# Patient Record
Sex: MALE | Race: OTHER | HISPANIC OR LATINO
[De-identification: names, ages, dates, MRNs, and addresses within clinical notes are randomized per-mention and may not be internally consistent; named-entity substitution may affect disease eponyms.]

---

## 2017-01-24 ENCOUNTER — RECORD ABSTRACTING (OUTPATIENT)
Age: 32
End: 2017-01-24

## 2017-01-24 DIAGNOSIS — E78.5 HYPERLIPIDEMIA, UNSPECIFIED: ICD-10-CM

## 2017-01-24 DIAGNOSIS — Z86.39 PERSONAL HISTORY OF OTHER ENDOCRINE, NUTRITIONAL AND METABOLIC DISEASE: ICD-10-CM

## 2017-03-29 ENCOUNTER — APPOINTMENT (OUTPATIENT)
Dept: INTERNAL MEDICINE | Facility: CLINIC | Age: 32
End: 2017-03-29

## 2017-03-29 ENCOUNTER — RESULT REVIEW (OUTPATIENT)
Age: 32
End: 2017-03-29

## 2017-03-29 VITALS
HEIGHT: 75 IN | BODY MASS INDEX: 33.82 KG/M2 | DIASTOLIC BLOOD PRESSURE: 86 MMHG | WEIGHT: 272 LBS | SYSTOLIC BLOOD PRESSURE: 121 MMHG | HEART RATE: 120 BPM | TEMPERATURE: 97.6 F

## 2017-03-30 LAB — TSH SERPL DL<=0.005 MIU/L-ACNC: 1.38 UIU/ML

## 2017-04-13 ENCOUNTER — APPOINTMENT (OUTPATIENT)
Dept: NUTRITION | Facility: CLINIC | Age: 32
End: 2017-04-13

## 2017-06-14 ENCOUNTER — OUTPATIENT (OUTPATIENT)
Dept: OUTPATIENT SERVICES | Facility: HOSPITAL | Age: 32
LOS: 1 days | Discharge: HOME | End: 2017-06-14

## 2017-06-14 DIAGNOSIS — F20.9 SCHIZOPHRENIA, UNSPECIFIED: ICD-10-CM

## 2017-06-21 ENCOUNTER — OUTPATIENT (OUTPATIENT)
Dept: OUTPATIENT SERVICES | Facility: HOSPITAL | Age: 32
LOS: 1 days | Discharge: HOME | End: 2017-06-21

## 2017-06-21 DIAGNOSIS — F20.9 SCHIZOPHRENIA, UNSPECIFIED: ICD-10-CM

## 2017-06-28 ENCOUNTER — OUTPATIENT (OUTPATIENT)
Dept: OUTPATIENT SERVICES | Facility: HOSPITAL | Age: 32
LOS: 1 days | Discharge: HOME | End: 2017-06-28

## 2017-06-28 DIAGNOSIS — F20.9 SCHIZOPHRENIA, UNSPECIFIED: ICD-10-CM

## 2017-06-28 DIAGNOSIS — Z79.899 OTHER LONG TERM (CURRENT) DRUG THERAPY: ICD-10-CM

## 2017-06-29 ENCOUNTER — APPOINTMENT (OUTPATIENT)
Dept: INTERNAL MEDICINE | Facility: CLINIC | Age: 32
End: 2017-06-29

## 2017-07-05 ENCOUNTER — OUTPATIENT (OUTPATIENT)
Dept: OUTPATIENT SERVICES | Facility: HOSPITAL | Age: 32
LOS: 1 days | Discharge: HOME | End: 2017-07-05

## 2017-07-05 DIAGNOSIS — F20.9 SCHIZOPHRENIA, UNSPECIFIED: ICD-10-CM

## 2017-07-05 DIAGNOSIS — Z79.899 OTHER LONG TERM (CURRENT) DRUG THERAPY: ICD-10-CM

## 2017-07-12 ENCOUNTER — OUTPATIENT (OUTPATIENT)
Dept: OUTPATIENT SERVICES | Facility: HOSPITAL | Age: 32
LOS: 1 days | Discharge: HOME | End: 2017-07-12

## 2017-07-12 DIAGNOSIS — Z79.899 OTHER LONG TERM (CURRENT) DRUG THERAPY: ICD-10-CM

## 2017-07-12 DIAGNOSIS — F20.9 SCHIZOPHRENIA, UNSPECIFIED: ICD-10-CM

## 2017-07-19 ENCOUNTER — OUTPATIENT (OUTPATIENT)
Dept: OUTPATIENT SERVICES | Facility: HOSPITAL | Age: 32
LOS: 1 days | Discharge: HOME | End: 2017-07-19

## 2017-07-19 DIAGNOSIS — F20.9 SCHIZOPHRENIA, UNSPECIFIED: ICD-10-CM

## 2017-07-19 DIAGNOSIS — Z79.899 OTHER LONG TERM (CURRENT) DRUG THERAPY: ICD-10-CM

## 2017-07-26 ENCOUNTER — OUTPATIENT (OUTPATIENT)
Dept: OUTPATIENT SERVICES | Facility: HOSPITAL | Age: 32
LOS: 1 days | Discharge: HOME | End: 2017-07-26

## 2017-07-26 DIAGNOSIS — F20.9 SCHIZOPHRENIA, UNSPECIFIED: ICD-10-CM

## 2017-07-26 DIAGNOSIS — Z79.899 OTHER LONG TERM (CURRENT) DRUG THERAPY: ICD-10-CM

## 2017-07-27 ENCOUNTER — OUTPATIENT (OUTPATIENT)
Dept: OUTPATIENT SERVICES | Facility: HOSPITAL | Age: 32
LOS: 1 days | Discharge: HOME | End: 2017-07-27

## 2017-07-27 ENCOUNTER — APPOINTMENT (OUTPATIENT)
Dept: UROLOGY | Facility: CLINIC | Age: 32
End: 2017-07-27
Payer: MEDICAID

## 2017-07-27 VITALS — HEART RATE: 122 BPM | SYSTOLIC BLOOD PRESSURE: 116 MMHG | DIASTOLIC BLOOD PRESSURE: 80 MMHG

## 2017-07-27 DIAGNOSIS — F20.9 SCHIZOPHRENIA, UNSPECIFIED: ICD-10-CM

## 2017-07-27 PROCEDURE — 99203 OFFICE O/P NEW LOW 30 MIN: CPT

## 2017-08-02 ENCOUNTER — OUTPATIENT (OUTPATIENT)
Dept: OUTPATIENT SERVICES | Facility: HOSPITAL | Age: 32
LOS: 1 days | Discharge: HOME | End: 2017-08-02

## 2017-08-02 DIAGNOSIS — F20.9 SCHIZOPHRENIA, UNSPECIFIED: ICD-10-CM

## 2017-08-02 DIAGNOSIS — Z79.899 OTHER LONG TERM (CURRENT) DRUG THERAPY: ICD-10-CM

## 2017-08-02 DIAGNOSIS — Z02.9 ENCOUNTER FOR ADMINISTRATIVE EXAMINATIONS, UNSPECIFIED: ICD-10-CM

## 2017-08-03 ENCOUNTER — OUTPATIENT (OUTPATIENT)
Dept: OUTPATIENT SERVICES | Facility: HOSPITAL | Age: 32
LOS: 1 days | Discharge: HOME | End: 2017-08-03

## 2017-08-03 DIAGNOSIS — F20.9 SCHIZOPHRENIA, UNSPECIFIED: ICD-10-CM

## 2017-08-03 DIAGNOSIS — Z79.899 OTHER LONG TERM (CURRENT) DRUG THERAPY: ICD-10-CM

## 2017-08-09 ENCOUNTER — OUTPATIENT (OUTPATIENT)
Dept: OUTPATIENT SERVICES | Facility: HOSPITAL | Age: 32
LOS: 1 days | Discharge: HOME | End: 2017-08-09

## 2017-08-09 DIAGNOSIS — F20.9 SCHIZOPHRENIA, UNSPECIFIED: ICD-10-CM

## 2017-08-09 DIAGNOSIS — Z79.899 OTHER LONG TERM (CURRENT) DRUG THERAPY: ICD-10-CM

## 2017-08-16 ENCOUNTER — OUTPATIENT (OUTPATIENT)
Dept: OUTPATIENT SERVICES | Facility: HOSPITAL | Age: 32
LOS: 1 days | Discharge: HOME | End: 2017-08-16

## 2017-08-16 DIAGNOSIS — Z79.899 OTHER LONG TERM (CURRENT) DRUG THERAPY: ICD-10-CM

## 2017-08-16 DIAGNOSIS — F20.9 SCHIZOPHRENIA, UNSPECIFIED: ICD-10-CM

## 2017-08-23 ENCOUNTER — OUTPATIENT (OUTPATIENT)
Dept: OUTPATIENT SERVICES | Facility: HOSPITAL | Age: 32
LOS: 1 days | Discharge: HOME | End: 2017-08-23

## 2017-08-23 DIAGNOSIS — F20.9 SCHIZOPHRENIA, UNSPECIFIED: ICD-10-CM

## 2017-08-23 DIAGNOSIS — Z79.899 OTHER LONG TERM (CURRENT) DRUG THERAPY: ICD-10-CM

## 2017-08-30 ENCOUNTER — OUTPATIENT (OUTPATIENT)
Dept: OUTPATIENT SERVICES | Facility: HOSPITAL | Age: 32
LOS: 1 days | Discharge: HOME | End: 2017-08-30

## 2017-08-30 DIAGNOSIS — F20.9 SCHIZOPHRENIA, UNSPECIFIED: ICD-10-CM

## 2017-08-30 DIAGNOSIS — R32 UNSPECIFIED URINARY INCONTINENCE: ICD-10-CM

## 2017-08-30 DIAGNOSIS — Z79.899 OTHER LONG TERM (CURRENT) DRUG THERAPY: ICD-10-CM

## 2017-09-07 ENCOUNTER — APPOINTMENT (OUTPATIENT)
Dept: UROLOGY | Facility: CLINIC | Age: 32
End: 2017-09-07

## 2017-09-27 ENCOUNTER — OUTPATIENT (OUTPATIENT)
Dept: OUTPATIENT SERVICES | Facility: HOSPITAL | Age: 32
LOS: 1 days | Discharge: HOME | End: 2017-09-27

## 2017-09-27 DIAGNOSIS — F20.9 SCHIZOPHRENIA, UNSPECIFIED: ICD-10-CM

## 2017-09-27 DIAGNOSIS — Z79.899 OTHER LONG TERM (CURRENT) DRUG THERAPY: ICD-10-CM

## 2017-10-03 LAB
APPEARANCE UR: CLEAR
BILIRUB UR QL STRIP: NEGATIVE
COLOR UR: YELLOW
GLUCOSE UR STRIP-MCNC: NEGATIVE MG/DL
HGB UR QL STRIP: NEGATIVE
KETONES UR STRIP-MCNC: NEGATIVE MG/DL
NITRITE UR QL STRIP: NEGATIVE
PH UR STRIP: 6
PROT UR STRIP-MCNC: 30 MG/DL
SP GR UR STRIP: 1.02
UROBILINOGEN UR STRIP-MCNC: 0.2 MG/DL
WBC URNS QL MICRO: NEGATIVE

## 2017-10-25 ENCOUNTER — OUTPATIENT (OUTPATIENT)
Dept: OUTPATIENT SERVICES | Facility: HOSPITAL | Age: 32
LOS: 1 days | Discharge: HOME | End: 2017-10-25

## 2017-10-25 DIAGNOSIS — F20.9 SCHIZOPHRENIA, UNSPECIFIED: ICD-10-CM

## 2017-10-25 DIAGNOSIS — Z79.899 OTHER LONG TERM (CURRENT) DRUG THERAPY: ICD-10-CM

## 2017-11-21 ENCOUNTER — OUTPATIENT (OUTPATIENT)
Dept: OUTPATIENT SERVICES | Facility: HOSPITAL | Age: 32
LOS: 1 days | Discharge: HOME | End: 2017-11-21

## 2017-11-21 DIAGNOSIS — F20.9 SCHIZOPHRENIA, UNSPECIFIED: ICD-10-CM

## 2017-11-21 DIAGNOSIS — Z79.899 OTHER LONG TERM (CURRENT) DRUG THERAPY: ICD-10-CM

## 2017-12-20 ENCOUNTER — OUTPATIENT (OUTPATIENT)
Dept: OUTPATIENT SERVICES | Facility: HOSPITAL | Age: 32
LOS: 1 days | Discharge: HOME | End: 2017-12-20

## 2017-12-20 DIAGNOSIS — F20.9 SCHIZOPHRENIA, UNSPECIFIED: ICD-10-CM

## 2017-12-20 DIAGNOSIS — Z79.899 OTHER LONG TERM (CURRENT) DRUG THERAPY: ICD-10-CM

## 2017-12-21 ENCOUNTER — OUTPATIENT (OUTPATIENT)
Dept: OUTPATIENT SERVICES | Facility: HOSPITAL | Age: 32
LOS: 1 days | Discharge: HOME | End: 2017-12-21

## 2017-12-21 ENCOUNTER — APPOINTMENT (OUTPATIENT)
Dept: UROLOGY | Facility: CLINIC | Age: 32
End: 2017-12-21
Payer: MEDICAID

## 2017-12-21 ENCOUNTER — APPOINTMENT (OUTPATIENT)
Dept: INTERNAL MEDICINE | Facility: CLINIC | Age: 32
End: 2017-12-21
Payer: MEDICAID

## 2017-12-21 VITALS
DIASTOLIC BLOOD PRESSURE: 78 MMHG | SYSTOLIC BLOOD PRESSURE: 106 MMHG | HEIGHT: 73 IN | WEIGHT: 270 LBS | BODY MASS INDEX: 35.78 KG/M2 | HEART RATE: 98 BPM

## 2017-12-21 VITALS
SYSTOLIC BLOOD PRESSURE: 122 MMHG | BODY MASS INDEX: 33.53 KG/M2 | WEIGHT: 253 LBS | DIASTOLIC BLOOD PRESSURE: 84 MMHG | HEIGHT: 73 IN | HEART RATE: 109 BPM

## 2017-12-21 DIAGNOSIS — F20.9 SCHIZOPHRENIA, UNSPECIFIED: ICD-10-CM

## 2017-12-21 DIAGNOSIS — R35.0 FREQUENCY OF MICTURITION: ICD-10-CM

## 2017-12-21 PROCEDURE — 99213 OFFICE O/P EST LOW 20 MIN: CPT

## 2017-12-22 DIAGNOSIS — I10 ESSENTIAL (PRIMARY) HYPERTENSION: ICD-10-CM

## 2017-12-22 DIAGNOSIS — E66.9 OBESITY, UNSPECIFIED: ICD-10-CM

## 2017-12-22 DIAGNOSIS — E78.5 HYPERLIPIDEMIA, UNSPECIFIED: ICD-10-CM

## 2017-12-22 DIAGNOSIS — F20.89 OTHER SCHIZOPHRENIA: ICD-10-CM

## 2018-01-17 ENCOUNTER — OUTPATIENT (OUTPATIENT)
Dept: OUTPATIENT SERVICES | Facility: HOSPITAL | Age: 33
LOS: 1 days | Discharge: HOME | End: 2018-01-17

## 2018-01-17 DIAGNOSIS — F20.9 SCHIZOPHRENIA, UNSPECIFIED: ICD-10-CM

## 2018-01-17 DIAGNOSIS — Z79.899 OTHER LONG TERM (CURRENT) DRUG THERAPY: ICD-10-CM

## 2018-02-14 ENCOUNTER — OUTPATIENT (OUTPATIENT)
Dept: OUTPATIENT SERVICES | Facility: HOSPITAL | Age: 33
LOS: 1 days | Discharge: HOME | End: 2018-02-14

## 2018-02-14 DIAGNOSIS — F20.9 SCHIZOPHRENIA, UNSPECIFIED: ICD-10-CM

## 2018-02-14 DIAGNOSIS — Z79.899 OTHER LONG TERM (CURRENT) DRUG THERAPY: ICD-10-CM

## 2018-02-28 ENCOUNTER — APPOINTMENT (OUTPATIENT)
Dept: INTERNAL MEDICINE | Facility: CLINIC | Age: 33
End: 2018-02-28

## 2018-03-14 ENCOUNTER — OUTPATIENT (OUTPATIENT)
Dept: OUTPATIENT SERVICES | Facility: HOSPITAL | Age: 33
LOS: 1 days | Discharge: HOME | End: 2018-03-14

## 2018-03-14 DIAGNOSIS — F20.9 SCHIZOPHRENIA, UNSPECIFIED: ICD-10-CM

## 2018-03-14 DIAGNOSIS — Z79.899 OTHER LONG TERM (CURRENT) DRUG THERAPY: ICD-10-CM

## 2018-03-27 ENCOUNTER — LABORATORY RESULT (OUTPATIENT)
Age: 33
End: 2018-03-27

## 2018-03-27 ENCOUNTER — APPOINTMENT (OUTPATIENT)
Dept: INTERNAL MEDICINE | Facility: CLINIC | Age: 33
End: 2018-03-27

## 2018-03-27 ENCOUNTER — OUTPATIENT (OUTPATIENT)
Dept: OUTPATIENT SERVICES | Facility: HOSPITAL | Age: 33
LOS: 1 days | Discharge: HOME | End: 2018-03-27

## 2018-03-27 VITALS
DIASTOLIC BLOOD PRESSURE: 87 MMHG | SYSTOLIC BLOOD PRESSURE: 124 MMHG | HEIGHT: 73 IN | WEIGHT: 258 LBS | HEART RATE: 112 BPM | BODY MASS INDEX: 34.19 KG/M2

## 2018-03-27 DIAGNOSIS — R41.89 OTHER SYMPTOMS AND SIGNS INVOLVING COGNITIVE FUNCTIONS AND AWARENESS: ICD-10-CM

## 2018-03-28 DIAGNOSIS — E78.5 HYPERLIPIDEMIA, UNSPECIFIED: ICD-10-CM

## 2018-03-28 DIAGNOSIS — J44.9 CHRONIC OBSTRUCTIVE PULMONARY DISEASE, UNSPECIFIED: ICD-10-CM

## 2018-03-28 DIAGNOSIS — E11.65 TYPE 2 DIABETES MELLITUS WITH HYPERGLYCEMIA: ICD-10-CM

## 2018-03-28 DIAGNOSIS — N40.0 BENIGN PROSTATIC HYPERPLASIA WITHOUT LOWER URINARY TRACT SYMPTOMS: ICD-10-CM

## 2018-03-28 DIAGNOSIS — I10 ESSENTIAL (PRIMARY) HYPERTENSION: ICD-10-CM

## 2018-03-28 LAB
ALBUMIN SERPL ELPH-MCNC: 4.6 G/DL
ALP BLD-CCNC: 110 U/L
ALT SERPL-CCNC: 20 U/L
ANION GAP SERPL CALC-SCNC: 16 MMOL/L
APPEARANCE: CLEAR
AST SERPL-CCNC: 17 U/L
BILIRUB SERPL-MCNC: 0.3 MG/DL
BILIRUBIN URINE: NEGATIVE
BLOOD URINE: NEGATIVE
BUN SERPL-MCNC: 13 MG/DL
CALCIUM SERPL-MCNC: 9.2 MG/DL
CHLORIDE SERPL-SCNC: 104 MMOL/L
CHOLEST SERPL-MCNC: 208 MG/DL
CHOLEST/HDLC SERPL: 5.8 RATIO
CO2 SERPL-SCNC: 22 MMOL/L
COLOR: YELLOW
CREAT SERPL-MCNC: 0.7 MG/DL
GLUCOSE QUALITATIVE U: NEGATIVE MG/DL
GLUCOSE SERPL-MCNC: 85 MG/DL
HDLC SERPL-MCNC: 36 MG/DL
KETONES URINE: NEGATIVE
LDLC SERPL CALC-MCNC: 135 MG/DL
LEUKOCYTE ESTERASE URINE: NEGATIVE
NITRITE URINE: NEGATIVE
PH URINE: 6
POTASSIUM SERPL-SCNC: 4.1 MMOL/L
PROT SERPL-MCNC: 7.4 G/DL
PROTEIN URINE: ABNORMAL MG/DL
SODIUM SERPL-SCNC: 142 MMOL/L
SPECIFIC GRAVITY URINE: 1.01
TRIGL SERPL-MCNC: 321 MG/DL
TSH SERPL-ACNC: 2.24 UIU/ML
UROBILINOGEN URINE: NEGATIVE MG/DL

## 2018-03-29 ENCOUNTER — APPOINTMENT (OUTPATIENT)
Dept: UROLOGY | Facility: CLINIC | Age: 33
End: 2018-03-29

## 2018-04-10 LAB — 25(OH)D3 SERPL-MCNC: 17 NG/ML

## 2018-04-11 ENCOUNTER — OUTPATIENT (OUTPATIENT)
Dept: OUTPATIENT SERVICES | Facility: HOSPITAL | Age: 33
LOS: 1 days | Discharge: HOME | End: 2018-04-11

## 2018-04-11 DIAGNOSIS — F20.9 SCHIZOPHRENIA, UNSPECIFIED: ICD-10-CM

## 2018-04-11 DIAGNOSIS — Z79.899 OTHER LONG TERM (CURRENT) DRUG THERAPY: ICD-10-CM

## 2018-04-23 ENCOUNTER — RX RENEWAL (OUTPATIENT)
Age: 33
End: 2018-04-23

## 2018-04-30 ENCOUNTER — APPOINTMENT (OUTPATIENT)
Dept: NUTRITION | Facility: CLINIC | Age: 33
End: 2018-04-30

## 2018-05-09 ENCOUNTER — OUTPATIENT (OUTPATIENT)
Dept: OUTPATIENT SERVICES | Facility: HOSPITAL | Age: 33
LOS: 1 days | Discharge: HOME | End: 2018-05-09

## 2018-05-09 DIAGNOSIS — Z79.899 OTHER LONG TERM (CURRENT) DRUG THERAPY: ICD-10-CM

## 2018-05-09 DIAGNOSIS — F20.9 SCHIZOPHRENIA, UNSPECIFIED: ICD-10-CM

## 2018-05-25 ENCOUNTER — OUTPATIENT (OUTPATIENT)
Dept: OUTPATIENT SERVICES | Facility: HOSPITAL | Age: 33
LOS: 1 days | Discharge: HOME | End: 2018-05-25

## 2018-05-25 DIAGNOSIS — R10.10 UPPER ABDOMINAL PAIN, UNSPECIFIED: ICD-10-CM

## 2018-06-06 ENCOUNTER — OUTPATIENT (OUTPATIENT)
Dept: OUTPATIENT SERVICES | Facility: HOSPITAL | Age: 33
LOS: 1 days | Discharge: HOME | End: 2018-06-06

## 2018-06-06 DIAGNOSIS — F20.9 SCHIZOPHRENIA, UNSPECIFIED: ICD-10-CM

## 2018-06-06 DIAGNOSIS — Z79.899 OTHER LONG TERM (CURRENT) DRUG THERAPY: ICD-10-CM

## 2018-07-03 ENCOUNTER — OUTPATIENT (OUTPATIENT)
Dept: OUTPATIENT SERVICES | Facility: HOSPITAL | Age: 33
LOS: 1 days | Discharge: HOME | End: 2018-07-03

## 2018-07-03 DIAGNOSIS — Z79.899 OTHER LONG TERM (CURRENT) DRUG THERAPY: ICD-10-CM

## 2018-07-03 DIAGNOSIS — F20.9 SCHIZOPHRENIA, UNSPECIFIED: ICD-10-CM

## 2018-08-01 ENCOUNTER — OUTPATIENT (OUTPATIENT)
Dept: OUTPATIENT SERVICES | Facility: HOSPITAL | Age: 33
LOS: 1 days | Discharge: HOME | End: 2018-08-01

## 2018-08-01 DIAGNOSIS — Z79.899 OTHER LONG TERM (CURRENT) DRUG THERAPY: ICD-10-CM

## 2018-08-01 DIAGNOSIS — F20.9 SCHIZOPHRENIA, UNSPECIFIED: ICD-10-CM

## 2018-08-29 ENCOUNTER — OUTPATIENT (OUTPATIENT)
Dept: OUTPATIENT SERVICES | Facility: HOSPITAL | Age: 33
LOS: 1 days | Discharge: HOME | End: 2018-08-29

## 2018-08-29 DIAGNOSIS — Z79.899 OTHER LONG TERM (CURRENT) DRUG THERAPY: ICD-10-CM

## 2018-08-29 DIAGNOSIS — F20.9 SCHIZOPHRENIA, UNSPECIFIED: ICD-10-CM

## 2018-09-25 ENCOUNTER — APPOINTMENT (OUTPATIENT)
Dept: INTERNAL MEDICINE | Facility: CLINIC | Age: 33
End: 2018-09-25

## 2018-09-26 ENCOUNTER — OUTPATIENT (OUTPATIENT)
Dept: OUTPATIENT SERVICES | Facility: HOSPITAL | Age: 33
LOS: 1 days | Discharge: HOME | End: 2018-09-26

## 2018-09-26 DIAGNOSIS — Z79.899 OTHER LONG TERM (CURRENT) DRUG THERAPY: ICD-10-CM

## 2018-09-26 DIAGNOSIS — F20.9 SCHIZOPHRENIA, UNSPECIFIED: ICD-10-CM

## 2018-10-24 ENCOUNTER — OUTPATIENT (OUTPATIENT)
Dept: OUTPATIENT SERVICES | Facility: HOSPITAL | Age: 33
LOS: 1 days | Discharge: HOME | End: 2018-10-24

## 2018-10-24 DIAGNOSIS — F20.9 SCHIZOPHRENIA, UNSPECIFIED: ICD-10-CM

## 2018-10-24 DIAGNOSIS — Z79.899 OTHER LONG TERM (CURRENT) DRUG THERAPY: ICD-10-CM

## 2018-10-31 ENCOUNTER — APPOINTMENT (OUTPATIENT)
Dept: INTERNAL MEDICINE | Facility: CLINIC | Age: 33
End: 2018-10-31

## 2018-11-15 ENCOUNTER — EMERGENCY (EMERGENCY)
Facility: HOSPITAL | Age: 33
LOS: 0 days | Discharge: HOME | End: 2018-11-15
Attending: EMERGENCY MEDICINE | Admitting: EMERGENCY MEDICINE

## 2018-11-15 VITALS
RESPIRATION RATE: 20 BRPM | SYSTOLIC BLOOD PRESSURE: 128 MMHG | OXYGEN SATURATION: 99 % | DIASTOLIC BLOOD PRESSURE: 79 MMHG | TEMPERATURE: 97 F | HEART RATE: 110 BPM

## 2018-11-15 VITALS — HEART RATE: 92 BPM

## 2018-11-15 DIAGNOSIS — T42.4X5A ADVERSE EFFECT OF BENZODIAZEPINES, INITIAL ENCOUNTER: ICD-10-CM

## 2018-11-15 DIAGNOSIS — R11.0 NAUSEA: ICD-10-CM

## 2018-11-15 NOTE — ED ADULT NURSE NOTE - CHIEF COMPLAINT QUOTE
Patient given the wrong medication from [address] [place] [address]. Patient given Clozapine and Klonopin. Patient only takes Clozapine.

## 2018-11-15 NOTE — ED PROVIDER NOTE - PROGRESS NOTE DETAILS
Spoke with nurse at [facility], pt was given 2mh of klonopin instead of 100 mg clozipine. Pt took normal scheduled clozipine and other medication. Nurse reports pt stated he was nauseas and was sent to ED. Pt in ED now with no complaints. Spoke with nurse at [facility], pt was given 2mh of Klonopin instead of 100 mg clozapine. Pt took normal scheduled clozapine and other medication. Nurse reports pt stated he was nauseas and was sent to ED. Pt in ED now with no complaints. Pt is now more awake in ED. Drinking juice and eating. I was directly involved in the management of this patient. Case was discussed with PA Fellow Eusebio

## 2018-11-15 NOTE — ED PROVIDER NOTE - ATTENDING CONTRIBUTION TO CARE
33-year-old male sent from [facility] after an accidental ingestion of 2 mg clonazepam at approximately 9:15 AM today, the patient transiently felt nauseated, however currently feels tired but denies other complaints, denies modifying factors or other associated complaint is present.      VSS, awake, alert, non-toxic appearing, lying comfortably in stretcher, in NAD, ears clear, oropharynx clear, mmm, no JVD or bruit, no skin rash or lesions, chest CTAB, non-labored breathing, no w/r/r, +S1/S2, RRR, no m/r/g, abdomen soft, NT, ND, +BS, no peripheral edema or deformities, alert, clear speech and steady gait. The case was discussed with toxicology who recommend outpatient observation, which can be performed at [facility].

## 2018-11-15 NOTE — ED PROVIDER NOTE - PHYSICAL EXAMINATION
Physical Exam    Vital Signs: I have reviewed the initial vital signs.  Constitutional: well-nourished, appears stated age, no acute distress  Eyes: Conjunctiva pink, Sclera clear, PERRLA, EOMI.  Cardiovascular: S1 and S2, regular rate, regular rhythm, well-perfused extremities, radial pulses equal and 2+  Respiratory: unlabored respiratory effort, clear to auscultation bilaterally no wheezing, rales and rhonchi  Gastrointestinal: soft, non-tender abdomen, no pulsatile mass, normal bowl sounds  Integumentary: warm, dry, no rash  Neurologic: awake, alert, cranial nerves II-XII grossly intact, extremities’ motor and sensory functions grossly intact  Psychiatric: Pt appears sleepy, but is accusable to verbal stimuli and answers all questions appropriately.

## 2018-11-15 NOTE — ED PROVIDER NOTE - NS ED ROS FT
Constitutional: (-) fever (-) fatigue, (-) weakness  Eyes: (-) visual changes  Cardiovascular: (-) chest pain, (-) syncope  Respiratory: (-) cough, (-) shortness of breath, (-) dyspnea,   Gastrointestinal: (-) vomiting, (-) diarrhea, (-)nausea,   Integumentary: (-) rash  Neurological: (-) headache, , (-) dizziness, (-) tingling, (-)numbness  Peripheral Vascular: (-) pain while walking, (-) leg swelling, (-) color changes  :(-)dysuria,

## 2018-11-15 NOTE — ED ADULT NURSE NOTE - OBJECTIVE STATEMENT
patient states he was given wrong medication at [facility]. states he is supposed to get clozapine and was given Klonopin instead. patient denies chest pain/sob. states he feels tired

## 2018-11-15 NOTE — ED PROVIDER NOTE - OBJECTIVE STATEMENT
32 yo male, PMH of bipolar disorder and schizophrenia, presents to the ED from [facility]. Pt was given 2mg of klonopin mistakenly for 100 mg of clozapine. At this time patient has no complaints. Denies chest pain, N/V/D, SOB, palpiations, HA, numbness, tingling, dizziness, abd pain, fever, visual changes, and rash. 34 yo male, PMH of bipolar disorder and schizophrenia, presents to the ED from [facility]. Pt was given 2mg of klonopin mistakenly for 100 mg of clozapine. At this time patient has no complaints. Denies chest pain, N/V/D, SOB, palpitations, HA, numbness, tingling, dizziness, abd pain, fever, visual changes, and rash.

## 2018-11-15 NOTE — ED ADULT TRIAGE NOTE - CHIEF COMPLAINT QUOTE
Patient given the wrong medication from [address] [place] [address]. Patient given Clozapine and Klonopin. Patient only takes Clozapine.

## 2018-11-20 ENCOUNTER — OUTPATIENT (OUTPATIENT)
Dept: OUTPATIENT SERVICES | Facility: HOSPITAL | Age: 33
LOS: 1 days | Discharge: HOME | End: 2018-11-20

## 2018-11-20 DIAGNOSIS — Z79.899 OTHER LONG TERM (CURRENT) DRUG THERAPY: ICD-10-CM

## 2018-11-20 DIAGNOSIS — F20.9 SCHIZOPHRENIA, UNSPECIFIED: ICD-10-CM

## 2018-12-19 ENCOUNTER — OUTPATIENT (OUTPATIENT)
Dept: OUTPATIENT SERVICES | Facility: HOSPITAL | Age: 33
LOS: 1 days | Discharge: HOME | End: 2018-12-19

## 2018-12-19 ENCOUNTER — APPOINTMENT (OUTPATIENT)
Dept: INTERNAL MEDICINE | Facility: CLINIC | Age: 33
End: 2018-12-19

## 2018-12-19 VITALS
WEIGHT: 262 LBS | DIASTOLIC BLOOD PRESSURE: 76 MMHG | HEART RATE: 121 BPM | BODY MASS INDEX: 34.72 KG/M2 | HEIGHT: 73 IN | TEMPERATURE: 98.1 F | SYSTOLIC BLOOD PRESSURE: 118 MMHG

## 2018-12-19 DIAGNOSIS — F20.9 SCHIZOPHRENIA, UNSPECIFIED: ICD-10-CM

## 2018-12-19 DIAGNOSIS — Z79.899 OTHER LONG TERM (CURRENT) DRUG THERAPY: ICD-10-CM

## 2018-12-19 NOTE — ASSESSMENT
[FreeTextEntry1] : 32 years old male with history of DLD, Obesity, schizoaffective disorder and urinary incontinence resides in a group home presenting for routine follow up. Last seen on 03/27/2018 for routine f/u. Patient currently denies any complaints.\par \par #) Vitamin D Deficiency\par -Vit. D (04/10/2018)- 17\par -started vitamin D pill\par \par #) Urinary incontinence\par -patient is maintained on oxybutynin\par -follows w/ urologist (Dr. Worthy)\par \par #) Schizoaffective disorder\par -on antipsychotics and is following up with psychiatry. Will refer for neuropsychology for cognitive impairment evaluation as per  "Anne-Marie Thomas"\par \par #) DLD\par -Lipid profile (03/28/2018)- Tot. Cho.- 208, TG- 321, HDL- 36, LDL- 135\par -Age<40, ASCVD not applciable\par -Continue diet and exercise\par -dietician referral given\par \par #) Obesity\par -Continue diet and exercise\par \par #) HCM\par -118/76\par -flu shot given\par -cbc, cmp (03/28/2018)- wnl\par -A1c (03/28/2018)- 5.1\par -TSH (03/28/2018) -2.24\par -routine bloodwork ordered- a1c, lipid profile\par -RTC in 6 months\par

## 2018-12-19 NOTE — PHYSICAL EXAM
[Normal] : no CVA tenderness, no spinal tenderness [de-identified] : denies homicidal/ suicidal ideations

## 2018-12-20 DIAGNOSIS — Z23 ENCOUNTER FOR IMMUNIZATION: ICD-10-CM

## 2018-12-20 DIAGNOSIS — E78.5 HYPERLIPIDEMIA, UNSPECIFIED: ICD-10-CM

## 2018-12-20 DIAGNOSIS — E66.9 OBESITY, UNSPECIFIED: ICD-10-CM

## 2018-12-20 DIAGNOSIS — F20.89 OTHER SCHIZOPHRENIA: ICD-10-CM

## 2018-12-31 ENCOUNTER — APPOINTMENT (OUTPATIENT)
Dept: NUTRITION | Facility: CLINIC | Age: 33
End: 2018-12-31

## 2019-01-16 ENCOUNTER — OUTPATIENT (OUTPATIENT)
Dept: OUTPATIENT SERVICES | Facility: HOSPITAL | Age: 34
LOS: 1 days | Discharge: HOME | End: 2019-01-16

## 2019-01-16 DIAGNOSIS — Z79.899 OTHER LONG TERM (CURRENT) DRUG THERAPY: ICD-10-CM

## 2019-01-16 DIAGNOSIS — F20.9 SCHIZOPHRENIA, UNSPECIFIED: ICD-10-CM

## 2019-02-13 ENCOUNTER — OUTPATIENT (OUTPATIENT)
Dept: OUTPATIENT SERVICES | Facility: HOSPITAL | Age: 34
LOS: 1 days | Discharge: HOME | End: 2019-02-13

## 2019-02-13 ENCOUNTER — RX RENEWAL (OUTPATIENT)
Age: 34
End: 2019-02-13

## 2019-02-13 DIAGNOSIS — F20.9 SCHIZOPHRENIA, UNSPECIFIED: ICD-10-CM

## 2019-02-13 DIAGNOSIS — Z79.899 OTHER LONG TERM (CURRENT) DRUG THERAPY: ICD-10-CM

## 2019-02-18 ENCOUNTER — RX RENEWAL (OUTPATIENT)
Age: 34
End: 2019-02-18

## 2019-03-06 ENCOUNTER — OUTPATIENT (OUTPATIENT)
Dept: OUTPATIENT SERVICES | Facility: HOSPITAL | Age: 34
LOS: 1 days | Discharge: HOME | End: 2019-03-06

## 2019-03-06 ENCOUNTER — APPOINTMENT (OUTPATIENT)
Dept: INTERNAL MEDICINE | Facility: CLINIC | Age: 34
End: 2019-03-06

## 2019-03-06 ENCOUNTER — LABORATORY RESULT (OUTPATIENT)
Age: 34
End: 2019-03-06

## 2019-03-06 VITALS
TEMPERATURE: 98.1 F | SYSTOLIC BLOOD PRESSURE: 133 MMHG | DIASTOLIC BLOOD PRESSURE: 86 MMHG | WEIGHT: 259 LBS | BODY MASS INDEX: 34.33 KG/M2 | HEART RATE: 109 BPM | HEIGHT: 73 IN

## 2019-03-06 NOTE — ASSESSMENT
[FreeTextEntry1] : 32 years old male with history of DLD, Obesity, schizoaffective disorder and urinary incontinence resides in a group home presenting for routine follow up. Last seen on 03/27/2018 for routine f/u. Patient currently denies any complaints.\par \par #) Vitamin D Deficiency\par -Vit. D (04/10/2018)- 17\par -will repeat level\par \par #) Urinary incontinence\par -patient is maintained on oxybutynin\par -advised to follow up with urologist (Dr. Worthy), referral given\par \par #) Schizoaffective disorder\par -on antipsychotics\par -f/u physch\par \par #) DLD\par -Lipid profile (03/28/2018)- Tot. Cho.- 208, TG- 321, HDL- 36, LDL- 135\par -Continue diet and exercise\par -will repeat lipid panel\par \par #) chronic constipation\par on docusate\par \par #) Obesity\par -Continue diet and exercise\par \par #) HCM\par -flu shot given in 12/2018\par -routine bloodwork ordered\par -RTC in 3 months

## 2019-03-06 NOTE — PHYSICAL EXAM
[No Acute Distress] : no acute distress [Well Nourished] : well nourished [Well Developed] : well developed [Well-Appearing] : well-appearing [Normal Sclera/Conjunctiva] : normal sclera/conjunctiva [PERRL] : pupils equal round and reactive to light [EOMI] : extraocular movements intact [Normal Outer Ear/Nose] : the outer ears and nose were normal in appearance [Normal Oropharynx] : the oropharynx was normal [No JVD] : no jugular venous distention [Supple] : supple [No Lymphadenopathy] : no lymphadenopathy [Thyroid Normal, No Nodules] : the thyroid was normal and there were no nodules present [No Respiratory Distress] : no respiratory distress  [Clear to Auscultation] : lungs were clear to auscultation bilaterally [No Accessory Muscle Use] : no accessory muscle use [Normal Rate] : normal rate  [Regular Rhythm] : with a regular rhythm [Normal S1, S2] : normal S1 and S2 [No Murmur] : no murmur heard [No Carotid Bruits] : no carotid bruits [No Abdominal Bruit] : a ~M bruit was not heard ~T in the abdomen [No Varicosities] : no varicosities [Pedal Pulses Present] : the pedal pulses are present [No Edema] : there was no peripheral edema [No Extremity Clubbing/Cyanosis] : no extremity clubbing/cyanosis [No Palpable Aorta] : no palpable aorta [Soft] : abdomen soft [Non Tender] : non-tender [Non-distended] : non-distended [No Masses] : no abdominal mass palpated [No HSM] : no HSM [Normal Bowel Sounds] : normal bowel sounds [No CVA Tenderness] : no CVA  tenderness [No Spinal Tenderness] : no spinal tenderness [No Joint Swelling] : no joint swelling [Grossly Normal Strength/Tone] : grossly normal strength/tone [No Rash] : no rash [No Focal Deficits] : no focal deficits [Deep Tendon Reflexes (DTR)] : deep tendon reflexes were 2+ and symmetric

## 2019-03-06 NOTE — HISTORY OF PRESENT ILLNESS
[FreeTextEntry1] : follow up [de-identified] : 33 years old male with history of DLD, Obesity, schizoaffective disorder and urinary incontinence resides in a group home presenting for routine follow up.patient feels well and denies any complaints. Patient currently denies any complaints.\par

## 2019-03-07 LAB
25(OH)D3 SERPL-MCNC: 30 NG/ML
ALBUMIN SERPL ELPH-MCNC: 4.6 G/DL
ALP BLD-CCNC: 113 U/L
ALT SERPL-CCNC: 16 U/L
ANION GAP SERPL CALC-SCNC: 16 MMOL/L
AST SERPL-CCNC: 13 U/L
BASOPHILS # BLD AUTO: 0 K/UL
BASOPHILS NFR BLD AUTO: 0 %
BILIRUB SERPL-MCNC: 0.4 MG/DL
BUN SERPL-MCNC: 12 MG/DL
CALCIUM SERPL-MCNC: 9.5 MG/DL
CHLORIDE SERPL-SCNC: 103 MMOL/L
CHOLEST SERPL-MCNC: 187 MG/DL
CHOLEST/HDLC SERPL: 4.8 RATIO
CO2 SERPL-SCNC: 24 MMOL/L
CREAT SERPL-MCNC: 0.7 MG/DL
EOSINOPHIL # BLD AUTO: 0 K/UL
EOSINOPHIL NFR BLD AUTO: 0 %
GLUCOSE SERPL-MCNC: 114 MG/DL
HBV SURFACE AB SER QL: NONREACTIVE
HBV SURFACE AG SER QL: NONREACTIVE
HCT VFR BLD CALC: 43.5 %
HCV AB SER QL: NONREACTIVE
HCV S/CO RATIO: 0.19 S/CO
HDLC SERPL-MCNC: 39 MG/DL
HGB BLD-MCNC: 14.3 G/DL
HIV1+2 AB SPEC QL IA.RAPID: NONREACTIVE
IMM GRANULOCYTES NFR BLD AUTO: 0.3 %
LDLC SERPL CALC-MCNC: 128 MG/DL
LYMPHOCYTES # BLD AUTO: 1.45 K/UL
LYMPHOCYTES NFR BLD AUTO: 20.3 %
MAN DIFF?: NORMAL
MCHC RBC-ENTMCNC: 28.1 PG
MCHC RBC-ENTMCNC: 32.9 G/DL
MCV RBC AUTO: 85.5 FL
MEV IGG FLD QL IA: 158 AU/ML
MEV IGG+IGM SER-IMP: POSITIVE
MONOCYTES # BLD AUTO: 0.44 K/UL
MONOCYTES NFR BLD AUTO: 6.1 %
MUV AB SER-ACNC: POSITIVE
MUV IGG SER QL IA: 264 AU/ML
NEUTROPHILS # BLD AUTO: 5.25 K/UL
NEUTROPHILS NFR BLD AUTO: 73.3 %
PLATELET # BLD AUTO: 331 K/UL
POTASSIUM SERPL-SCNC: 4 MMOL/L
PROT SERPL-MCNC: 7.8 G/DL
RBC # BLD: 5.09 M/UL
RBC # FLD: 12.7 %
RUBV IGG FLD-ACNC: 2.8 INDEX
RUBV IGG SER-IMP: POSITIVE
SODIUM SERPL-SCNC: 143 MMOL/L
TRIGL SERPL-MCNC: 223 MG/DL
TSH SERPL-ACNC: 1.47 UIU/ML
WBC # FLD AUTO: 7.16 K/UL

## 2019-03-12 LAB
M TB IFN-G BLD-IMP: NEGATIVE
QUANTIFERON TB PLUS MITOGEN MINUS NIL: 8.55 IU/ML
QUANTIFERON TB PLUS NIL: 0.02 IU/ML
QUANTIFERON TB PLUS TB1 MINUS NIL: 0 IU/ML
QUANTIFERON TB PLUS TB2 MINUS NIL: -0.01 IU/ML

## 2019-03-13 ENCOUNTER — OUTPATIENT (OUTPATIENT)
Dept: OUTPATIENT SERVICES | Facility: HOSPITAL | Age: 34
LOS: 1 days | Discharge: HOME | End: 2019-03-13

## 2019-03-13 DIAGNOSIS — F20.9 SCHIZOPHRENIA, UNSPECIFIED: ICD-10-CM

## 2019-03-13 DIAGNOSIS — Z79.899 OTHER LONG TERM (CURRENT) DRUG THERAPY: ICD-10-CM

## 2019-03-14 ENCOUNTER — APPOINTMENT (OUTPATIENT)
Dept: UROLOGY | Facility: CLINIC | Age: 34
End: 2019-03-14
Payer: MEDICAID

## 2019-03-14 ENCOUNTER — OUTPATIENT (OUTPATIENT)
Dept: OUTPATIENT SERVICES | Facility: HOSPITAL | Age: 34
LOS: 1 days | Discharge: HOME | End: 2019-03-14

## 2019-03-14 VITALS
DIASTOLIC BLOOD PRESSURE: 86 MMHG | SYSTOLIC BLOOD PRESSURE: 121 MMHG | WEIGHT: 259 LBS | HEIGHT: 73 IN | BODY MASS INDEX: 34.33 KG/M2 | HEART RATE: 117 BPM

## 2019-03-14 DIAGNOSIS — N13.8 BENIGN PROSTATIC HYPERPLASIA WITH LOWER URINARY TRACT SYMPMS: ICD-10-CM

## 2019-03-14 DIAGNOSIS — N40.1 BENIGN PROSTATIC HYPERPLASIA WITH LOWER URINARY TRACT SYMPMS: ICD-10-CM

## 2019-03-14 PROCEDURE — 99213 OFFICE O/P EST LOW 20 MIN: CPT

## 2019-03-14 NOTE — ASSESSMENT
[FreeTextEntry1] : This is a 33 year male who presents for evaluation of urinary incontinence. States leaks when he has the urge to urinate and also when he cough or sneezes. Has been going on since he was a child. Has chronic constipation - takes colace three times per day. clozapine for schizoaffective disorder. \par \par Miscellaneous Supply; Bedwetting alarm to use at nighttime nightly in underwear -- pt was not able to get this\par \par UA and urine culture to rule out UTI -- no signs of UTI\par Bladder sonogram to assess emptying of bladder and prostate -- prostate 35g and minimal PVR. \par \par Was started on ditropan last visit and states that has helped his symptoms tremendously. very happy.

## 2019-03-14 NOTE — PHYSICAL EXAM
[General Appearance - Well Developed] : well developed [General Appearance - Well Nourished] : well nourished [Normal Appearance] : normal appearance [Well Groomed] : well groomed [General Appearance - In No Acute Distress] : no acute distress [Abdomen Soft] : soft [Abdomen Tenderness] : non-tender [Costovertebral Angle Tenderness] : no ~M costovertebral angle tenderness [Skin Color & Pigmentation] : normal skin color and pigmentation [Heart Rate And Rhythm] : Heart rate and rhythm were normal [Edema] : no peripheral edema [] : no respiratory distress [Respiration, Rhythm And Depth] : normal respiratory rhythm and effort [Exaggerated Use Of Accessory Muscles For Inspiration] : no accessory muscle use [Oriented To Time, Place, And Person] : oriented to person, place, and time [Affect] : the affect was normal [Mood] : the mood was normal [Not Anxious] : not anxious [Normal Station and Gait] : the gait and station were normal for the patient's age [No Focal Deficits] : no focal deficits

## 2019-03-20 ENCOUNTER — OUTPATIENT (OUTPATIENT)
Dept: OUTPATIENT SERVICES | Facility: HOSPITAL | Age: 34
LOS: 1 days | Discharge: HOME | End: 2019-03-20

## 2019-03-20 DIAGNOSIS — Z79.899 OTHER LONG TERM (CURRENT) DRUG THERAPY: ICD-10-CM

## 2019-03-20 DIAGNOSIS — F20.9 SCHIZOPHRENIA, UNSPECIFIED: ICD-10-CM

## 2019-03-27 ENCOUNTER — OUTPATIENT (OUTPATIENT)
Dept: OUTPATIENT SERVICES | Facility: HOSPITAL | Age: 34
LOS: 1 days | Discharge: HOME | End: 2019-03-27

## 2019-03-27 DIAGNOSIS — Z79.899 OTHER LONG TERM (CURRENT) DRUG THERAPY: ICD-10-CM

## 2019-03-27 DIAGNOSIS — F20.9 SCHIZOPHRENIA, UNSPECIFIED: ICD-10-CM

## 2019-04-09 ENCOUNTER — FORM ENCOUNTER (OUTPATIENT)
Age: 34
End: 2019-04-09

## 2019-04-09 ENCOUNTER — OUTPATIENT (OUTPATIENT)
Dept: OUTPATIENT SERVICES | Facility: HOSPITAL | Age: 34
LOS: 1 days | Discharge: HOME | End: 2019-04-09

## 2019-04-10 ENCOUNTER — OUTPATIENT (OUTPATIENT)
Dept: OUTPATIENT SERVICES | Facility: HOSPITAL | Age: 34
LOS: 1 days | Discharge: HOME | End: 2019-04-10
Payer: MEDICAID

## 2019-04-10 DIAGNOSIS — R32 UNSPECIFIED URINARY INCONTINENCE: ICD-10-CM

## 2019-04-10 PROCEDURE — 76857 US EXAM PELVIC LIMITED: CPT | Mod: 26

## 2019-04-17 ENCOUNTER — OUTPATIENT (OUTPATIENT)
Dept: OUTPATIENT SERVICES | Facility: HOSPITAL | Age: 34
LOS: 1 days | Discharge: HOME | End: 2019-04-17

## 2019-04-17 DIAGNOSIS — F20.9 SCHIZOPHRENIA, UNSPECIFIED: ICD-10-CM

## 2019-04-17 DIAGNOSIS — Z79.899 OTHER LONG TERM (CURRENT) DRUG THERAPY: ICD-10-CM

## 2019-05-15 ENCOUNTER — FORM ENCOUNTER (OUTPATIENT)
Age: 34
End: 2019-05-15

## 2019-05-15 ENCOUNTER — OUTPATIENT (OUTPATIENT)
Dept: OUTPATIENT SERVICES | Facility: HOSPITAL | Age: 34
LOS: 1 days | Discharge: HOME | End: 2019-05-15

## 2019-05-15 DIAGNOSIS — F20.9 SCHIZOPHRENIA, UNSPECIFIED: ICD-10-CM

## 2019-05-16 ENCOUNTER — APPOINTMENT (OUTPATIENT)
Dept: INTERNAL MEDICINE | Facility: CLINIC | Age: 34
End: 2019-05-16

## 2019-05-16 ENCOUNTER — OUTPATIENT (OUTPATIENT)
Dept: OUTPATIENT SERVICES | Facility: HOSPITAL | Age: 34
LOS: 1 days | Discharge: HOME | End: 2019-05-16

## 2019-05-16 ENCOUNTER — OUTPATIENT (OUTPATIENT)
Dept: OUTPATIENT SERVICES | Facility: HOSPITAL | Age: 34
LOS: 1 days | Discharge: HOME | End: 2019-05-16
Payer: MEDICAID

## 2019-05-16 VITALS
HEIGHT: 73 IN | BODY MASS INDEX: 34.06 KG/M2 | WEIGHT: 257 LBS | TEMPERATURE: 97.3 F | HEART RATE: 105 BPM | DIASTOLIC BLOOD PRESSURE: 76 MMHG | SYSTOLIC BLOOD PRESSURE: 106 MMHG

## 2019-05-16 DIAGNOSIS — R05 COUGH: ICD-10-CM

## 2019-05-16 DIAGNOSIS — E55.9 VITAMIN D DEFICIENCY, UNSPECIFIED: ICD-10-CM

## 2019-05-16 PROCEDURE — 71046 X-RAY EXAM CHEST 2 VIEWS: CPT | Mod: 26

## 2019-05-16 RX ORDER — CHOLECALCIFEROL (VITAMIN D3) 1250 MCG
1.25 MG CAPSULE ORAL
Qty: 8 | Refills: 0 | Status: DISCONTINUED | COMMUNITY
Start: 2018-03-29 | End: 2019-05-16

## 2019-05-16 NOTE — PHYSICAL EXAM
[No Acute Distress] : no acute distress [Well-Appearing] : well-appearing [Well Nourished] : well nourished [Well Developed] : well developed [No Accessory Muscle Use] : no accessory muscle use [No Respiratory Distress] : no respiratory distress  [Clear to Auscultation] : lungs were clear to auscultation bilaterally [Normal S1, S2] : normal S1 and S2 [Regular Rhythm] : with a regular rhythm [Normal Rate] : normal rate  [Non-distended] : non-distended [No Murmur] : no murmur heard [Soft] : abdomen soft [Non Tender] : non-tender [No Masses] : no abdominal mass palpated [Alert and Oriented x3] : oriented to person, place, and time [Coordination Grossly Intact] : coordination grossly intact [No Focal Deficits] : no focal deficits

## 2019-05-16 NOTE — PLAN
[FreeTextEntry1] : 35 yo M with PMH of schizoaffective disorder, DLD, obesity, chronic constipation, urinary incontinence, vitamin D deficiency, resides in group home, here at clinic for routine follow up.\par \par #) Cough - likely post-infectious, mild, not overly bothersome, advised OTC cough drops, will repeat CXR\par \par #) Schizoaffective disorder - stable, on Clozapine, follows w/ Psych\par \par #) DLD - last HVZ=039, UC=935 from Mar 2019, counselled on diet + exercise\par \par #) Urinary incontinence - stable, c/w Oxybutynin, follows w/ Urology\par \par #) Vitamin D defiency - last level 30 from Mar 2019, wants to stop supplement, counselled on diet + lifestyle mods\par \par #) Chronic constipation - stable, c/w Docusate\par \par #) Obesity - stable, counselled on diet and exercise\par \par #) HCM\par - labs UTD\par - active smoker - trying to quit on own\par - RTC in 6 months or PRN

## 2019-05-17 DIAGNOSIS — K59.09 OTHER CONSTIPATION: ICD-10-CM

## 2019-05-17 DIAGNOSIS — E78.00 PURE HYPERCHOLESTEROLEMIA, UNSPECIFIED: ICD-10-CM

## 2019-06-12 ENCOUNTER — OUTPATIENT (OUTPATIENT)
Dept: OUTPATIENT SERVICES | Facility: HOSPITAL | Age: 34
LOS: 1 days | Discharge: HOME | End: 2019-06-12

## 2019-06-12 DIAGNOSIS — F20.9 SCHIZOPHRENIA, UNSPECIFIED: ICD-10-CM

## 2019-06-12 DIAGNOSIS — Z79.899 OTHER LONG TERM (CURRENT) DRUG THERAPY: ICD-10-CM

## 2019-06-27 ENCOUNTER — EMERGENCY (EMERGENCY)
Facility: HOSPITAL | Age: 34
LOS: 0 days | Discharge: HOME | End: 2019-06-27
Attending: EMERGENCY MEDICINE | Admitting: EMERGENCY MEDICINE
Payer: MEDICAID

## 2019-06-27 ENCOUNTER — APPOINTMENT (OUTPATIENT)
Dept: UROLOGY | Facility: CLINIC | Age: 34
End: 2019-06-27

## 2019-06-27 VITALS
HEART RATE: 107 BPM | RESPIRATION RATE: 18 BRPM | OXYGEN SATURATION: 96 % | SYSTOLIC BLOOD PRESSURE: 129 MMHG | DIASTOLIC BLOOD PRESSURE: 82 MMHG

## 2019-06-27 VITALS
HEART RATE: 105 BPM | SYSTOLIC BLOOD PRESSURE: 118 MMHG | HEIGHT: 73 IN | DIASTOLIC BLOOD PRESSURE: 82 MMHG | BODY MASS INDEX: 34.72 KG/M2 | WEIGHT: 262 LBS

## 2019-06-27 VITALS — HEART RATE: 92 BPM | SYSTOLIC BLOOD PRESSURE: 131 MMHG | DIASTOLIC BLOOD PRESSURE: 80 MMHG

## 2019-06-27 DIAGNOSIS — F25.9 SCHIZOAFFECTIVE DISORDER, UNSPECIFIED: ICD-10-CM

## 2019-06-27 DIAGNOSIS — Z03.89 ENCOUNTER FOR OBSERVATION FOR OTHER SUSPECTED DISEASES AND CONDITIONS RULED OUT: ICD-10-CM

## 2019-06-27 DIAGNOSIS — R69 ILLNESS, UNSPECIFIED: ICD-10-CM

## 2019-06-27 PROCEDURE — 99283 EMERGENCY DEPT VISIT LOW MDM: CPT

## 2019-06-27 PROCEDURE — 90792 PSYCH DIAG EVAL W/MED SRVCS: CPT | Mod: GC

## 2019-06-27 NOTE — ED BEHAVIORAL HEALTH ASSESSMENT NOTE - OTHER
psychiatrist and  at place of residence EMS [facility] as above auditory hallucinations mother's worsening medical state " I wanna go back to [facility]"

## 2019-06-27 NOTE — ED BEHAVIORAL HEALTH ASSESSMENT NOTE - CURRENT MEDICATION
Clozapine 200mg PO BID  Invega Sustenna 234mg IM [id number] (last on 6/11/19)    Oxybutynin 5mg PO qAM  Colace 100mg PO TID

## 2019-06-27 NOTE — ED BEHAVIORAL HEALTH ASSESSMENT NOTE - SUICIDE PROTECTIVE FACTORS
Responsibility to family and others/Identifies reasons for living/Positive therapeutic relationships

## 2019-06-27 NOTE — ED BEHAVIORAL HEALTH ASSESSMENT NOTE - HPI (INCLUDE ILLNESS QUALITY, SEVERITY, DURATION, TIMING, CONTEXT, MODIFYING FACTORS, ASSOCIATED SIGNS AND SYMPTOMS)
34 year old, Indonesian Male, domiciled in private room at [facility], single, no children, unemployed, with medical history of incontinence, with past psychiatric history of schizoaffective disorder, past IPP admissions (most recently approximately 1 years ago), no past suicide attempts, was brought in by ambulance after getting into a verbal altercation with a stranger on the street and endorsing suicidal ideation.  Psychiatry consult was placed for same.     On approach, patient is sitting in recliner with 1:1 at side, calm and cooperative with interview.  Patient can't recall the exact events leading up to his presentation in the ED and is his thought process is difficult to follow, however, he states that "I woke up this morning and I wasn't feeling too good, I'm not a bad person I just want my mom to live forever and I want to be closer to her so can I get transferred to another hospital."  He reports he can't recall the verbal altercation and does not remember stating that he wants to harm or kill himself, nor does he feel that way currently on interview.  Patient reports that he does hear sounds in his head ("like a mouse squeaking") and voices (multiple male voices "making fun of me but also telling me I'm a cutie pie"), however, states that he has heard the voices "for 6-7 years." Denies that the voices are telling him to hurt or kill himself or others and reports that he just ignores them.  Also reports that he feels scared because he feels that people are out to kill him, however, shortly after states "No that's gone now, I don't feel that way anymore." Patient again reiterated that he wished to be close to his mother so that she can visit him more frequently.   Reports "I want to be admitted to the [facility] hospital so that I can meet new people, tax returns and eat a lot of candy."  Patient then states that he feels safe to go back to [facility] and continue to work on transfer to a hospital in [place] so that he can be closer to his mom.  Denies seeing things that aren't there, denies any symptoms consistent with martha, anxiety or depression.     As per collateral obtained from his psychiatrist, Dr. Morales, patient has been present and engaged daily in the [facility] program at [place], and that his disorganized thought process and auditory hallucinations are at baseline and that he is compliant with his medications.  She reports that 2-3 weeks ago he learned that his mother has become sick and has been trying to get transferred to a residence closer to her.  She states that he made vague suicidal statement when he thought that wasn't going to happen, however, when patient was told that there is an HRA application in process to transfer him to [facility], he immediately recanted his statement.  She feels that he his appropriate for discharge and at baseline.    Collateral from  at [facility] also states that he has been engaged in his treatment.  Reports that getting into an altercation is out of character for him, as he is usually pleasant and friendly to staff and other residents.

## 2019-06-27 NOTE — ED PROVIDER NOTE - CLINICAL SUMMARY MEDICAL DECISION MAKING FREE TEXT BOX
Patient felt better during ED stay. Eval by psych who feels pt may be d/c for outpt f/u.  Pt contracts for safety.  Patient was discharged from the ED. Verbal instructions were given, including instructions to return to ED immediately for any new, worsening, or concerning symptoms. Patient endorsed understanding. Written discharge instructions additionally given, including follow-up plan.

## 2019-06-27 NOTE — ED ADULT NURSE NOTE - NSIMPLEMENTINTERV_GEN_ALL_ED
Implemented All Universal Safety Interventions:  [place] to call system. Call bell, personal items and telephone within reach. Instruct patient to call for assistance. Room bathroom lighting operational. Non-slip footwear when patient is off stretcher. Physically safe environment: no spills, clutter or unnecessary equipment. Stretcher in lowest position, wheels locked, appropriate side rails in place.

## 2019-06-27 NOTE — ED ADULT NURSE NOTE - OBJECTIVE STATEMENT
pt reports to the emergency room with reports of suicidal feelings. pt is alert, calm stated that he felt like hurting himself this morning using cigarettes but no longer wants to hurt himself. denies physical  pain, drug, alcohol use. reports taking his meds this morning.

## 2019-06-27 NOTE — ED BEHAVIORAL HEALTH ASSESSMENT NOTE - OTHER PAST PSYCHIATRIC HISTORY (INCLUDE DETAILS REGARDING ONSET, COURSE OF ILLNESS, INPATIENT/OUTPATIENT TREATMENT)
Patient has history of schizoaffective disorder, with history of inpatient psychiatric hospitalization, most recently one year ago.  Patient and psychiatrist both report no past suicide attempts.

## 2019-06-27 NOTE — ED ADULT TRIAGE NOTE - CHIEF COMPLAINT QUOTE
BIBA from [address], pt reports feeling suicidal but denies any plan, denies HI, pt also reports hearing voices since yesterday, charge RN made aware and 1:1 sitter activated in triage

## 2019-06-27 NOTE — ED PROVIDER NOTE - OBJECTIVE STATEMENT
35 y/o male with pmhx of schizoaffective disorder, bipolar disorder presents for psychiatric evaluation. Patient states he was outside, arguing with a stranger because the stranger told him that he was going to retirement - at that patient stated he wanted to hurt himself. Patient also admits to hearing voices earlier in the day. Patient currently denies HI/SI, denies attempt of suicide, or plan. Denies any injuries. Denies chest pain, sob, abdominal pain, n/v.

## 2019-06-27 NOTE — ED BEHAVIORAL HEALTH ASSESSMENT NOTE - DESCRIPTION
Patient was in good behavioral control during time in the ED and calm and cooperative with interview. incontinence 34 y o Maldivian male, domiciled at [facility], single, no children, previously domiciled at [place] in [place], w/10th grade education, with previous employment in retail

## 2019-06-27 NOTE — ED BEHAVIORAL HEALTH ASSESSMENT NOTE - SUMMARY
34 year old, [place] Islander Male, domiciled in private room at [facility], single, no children, unemployed, with medical history of incontinence, with past psychiatric history of schizoaffective disorder, past IPP admissions (most recently approximately 1 years ago), no past suicide attempts, was brought in by ambulance after getting into a verbal altercation with a stranger on the street and endorsing suicidal ideation.  Psychiatry consult was placed for same.     Though patient does present with disorganized and circumstantial speech, thought content marked by preoccupations, and auditory hallucinations, his presentation is at baseline, as per his psychiatrist, whom he interacts with daily in his outpatient program.  Patient is not currently endorsing suicidal or homicidal ideations and likely voiced them to stranger in context of altercation and poor coping skills, as patient has recent history of this in context of frustration.  Patient is complaint with his outpatient program and medications and does not present an imminent risk for harming himself or others (based on risk assessment below) and can be discharged back to [facility] with continued outpatient followup.      Recommendations:   -no acute psychiatrist intervention indicated at this time; [facility]  -please provided transport back to [facility]

## 2019-06-27 NOTE — ED BEHAVIORAL HEALTH ASSESSMENT NOTE - CASE SUMMARY
Mr Raymundo is a 34 year old man with a history of Schizoaffective disorder who presented to the ED for the evaluation of suicidal ideations. Patient was said to have also  informed ED  staff that he was hearing voices.   Patient appears to have chronic symptoms of psychosis i.e. the chromic auditory hallucinations, paranoia and disorganized behavior and he does not appear to have any symptoms suggestive of an acute decompensation of his psychiatric illness. Patient appears to make suicidal statements in the context of wanting to live near his mother in [place].  At this this time, patient is not considered an imminent danger to himself or others and does not need inpatient psychiatric hospitalization. Patient can be discharged back to his residence with the plan to follow up with his outpatient psychiatrist Dr Morales at his day program tomorrow. The staff at patient’s residence appear to be aware of patient’s stressor and are working with him to apply for a residence in [place]. Patient was informed of the plan and was agreeable.

## 2019-06-27 NOTE — ED BEHAVIORAL HEALTH ASSESSMENT NOTE - SAFETY PLAN DETAILS
patient reports that if he has thoughts of harming himself or others he will call 911 and report back to the ED

## 2019-06-27 NOTE — ED BEHAVIORAL HEALTH ASSESSMENT NOTE - RISK ASSESSMENT
Patient has chronic elevated risk for self harm given sex and chronic auditory hallucinations.  These risks are mitigated by lack of previous suicide attempts, lack of current suicidal ideation/intent/plan, lack of substance abuse, residential stability, medication and treatment compliance. Patient is thus not at imminent risk for self harm and does not meet criteria for involuntary inpatient psychiatric hospitalization.

## 2019-07-10 PROBLEM — F31.9 BIPOLAR DISORDER, UNSPECIFIED: Chronic | Status: ACTIVE | Noted: 2019-06-27

## 2019-07-10 PROBLEM — F25.0 SCHIZOAFFECTIVE DISORDER, BIPOLAR TYPE: Chronic | Status: ACTIVE | Noted: 2019-06-27

## 2019-07-11 ENCOUNTER — OUTPATIENT (OUTPATIENT)
Dept: OUTPATIENT SERVICES | Facility: HOSPITAL | Age: 34
LOS: 1 days | Discharge: HOME | End: 2019-07-11

## 2019-07-11 DIAGNOSIS — Z79.899 OTHER LONG TERM (CURRENT) DRUG THERAPY: ICD-10-CM

## 2019-07-11 DIAGNOSIS — F20.9 SCHIZOPHRENIA, UNSPECIFIED: ICD-10-CM

## 2019-07-24 ENCOUNTER — OUTPATIENT (OUTPATIENT)
Dept: OUTPATIENT SERVICES | Facility: HOSPITAL | Age: 34
LOS: 1 days | Discharge: HOME | End: 2019-07-24

## 2019-07-25 DIAGNOSIS — Z01.21 ENCOUNTER FOR DENTAL EXAMINATION AND CLEANING WITH ABNORMAL FINDINGS: ICD-10-CM

## 2019-08-07 ENCOUNTER — OUTPATIENT (OUTPATIENT)
Dept: OUTPATIENT SERVICES | Facility: HOSPITAL | Age: 34
LOS: 1 days | Discharge: HOME | End: 2019-08-07

## 2019-08-07 DIAGNOSIS — F20.9 SCHIZOPHRENIA, UNSPECIFIED: ICD-10-CM

## 2019-08-07 DIAGNOSIS — Z79.899 OTHER LONG TERM (CURRENT) DRUG THERAPY: ICD-10-CM

## 2019-09-04 ENCOUNTER — OUTPATIENT (OUTPATIENT)
Dept: OUTPATIENT SERVICES | Facility: HOSPITAL | Age: 34
LOS: 1 days | Discharge: HOME | End: 2019-09-04

## 2019-09-04 DIAGNOSIS — Z79.899 OTHER LONG TERM (CURRENT) DRUG THERAPY: ICD-10-CM

## 2019-09-04 DIAGNOSIS — F20.9 SCHIZOPHRENIA, UNSPECIFIED: ICD-10-CM

## 2019-09-13 ENCOUNTER — RX RENEWAL (OUTPATIENT)
Age: 34
End: 2019-09-13

## 2019-10-03 ENCOUNTER — OUTPATIENT (OUTPATIENT)
Dept: OUTPATIENT SERVICES | Facility: HOSPITAL | Age: 34
LOS: 1 days | Discharge: HOME | End: 2019-10-03

## 2019-10-03 DIAGNOSIS — F20.9 SCHIZOPHRENIA, UNSPECIFIED: ICD-10-CM

## 2019-10-03 DIAGNOSIS — Z79.899 OTHER LONG TERM (CURRENT) DRUG THERAPY: ICD-10-CM

## 2019-10-11 ENCOUNTER — APPOINTMENT (OUTPATIENT)
Dept: INTERNAL MEDICINE | Facility: CLINIC | Age: 34
End: 2019-10-11
Payer: MEDICAID

## 2019-10-11 ENCOUNTER — OUTPATIENT (OUTPATIENT)
Dept: OUTPATIENT SERVICES | Facility: HOSPITAL | Age: 34
LOS: 1 days | Discharge: HOME | End: 2019-10-11

## 2019-10-11 VITALS
SYSTOLIC BLOOD PRESSURE: 128 MMHG | BODY MASS INDEX: 35.25 KG/M2 | WEIGHT: 266 LBS | HEIGHT: 73 IN | HEART RATE: 116 BPM | DIASTOLIC BLOOD PRESSURE: 88 MMHG

## 2019-10-11 DIAGNOSIS — Z00.00 ENCOUNTER FOR GENERAL ADULT MEDICAL EXAMINATION W/OUT ABNORMAL FINDINGS: ICD-10-CM

## 2019-10-11 DIAGNOSIS — F25.0 SCHIZOAFFECTIVE DISORDER, BIPOLAR TYPE: ICD-10-CM

## 2019-10-11 DIAGNOSIS — K59.09 OTHER CONSTIPATION: ICD-10-CM

## 2019-10-11 DIAGNOSIS — E78.00 PURE HYPERCHOLESTEROLEMIA, UNSPECIFIED: ICD-10-CM

## 2019-10-11 DIAGNOSIS — R32 UNSPECIFIED URINARY INCONTINENCE: ICD-10-CM

## 2019-10-11 PROCEDURE — 99213 OFFICE O/P EST LOW 20 MIN: CPT | Mod: GC

## 2019-10-11 NOTE — HISTORY OF PRESENT ILLNESS
[FreeTextEntry1] : Routine follow up [de-identified] : 34 years old male with PMHx of schizoaffective disorder, DLD, obesity, chronic constipation, urinary incontinence, vitamin D deficiency, presented to the clinic for routine follow up. Patient is here to request approval for home health aide. Patient states that he is doing well. Denies fever/chills, cough, chest pain, shortness of breath, abdominal pain, n/v/d/c, or urinary symptoms. No suicidal/homicidal thoughts. No auditory/visual hallucination.

## 2019-10-11 NOTE — REVIEW OF SYSTEMS
[Incontinence] : incontinence [Fever] : no fever [Chills] : no chills [Fatigue] : no fatigue [Vision Problems] : no vision problems [Earache] : no earache [Sore Throat] : no sore throat [Chest Pain] : no chest pain [Palpitations] : no palpitations [Shortness Of Breath] : no shortness of breath [Wheezing] : no wheezing [Cough] : no cough [Abdominal Pain] : no abdominal pain [Nausea] : no nausea [Constipation] : no constipation [Diarrhea] : no diarrhea [Vomiting] : no vomiting [Heartburn] : no heartburn [Dysuria] : no dysuria [Hematuria] : no hematuria [Frequency] : no frequency [Joint Pain] : no joint pain [Joint Stiffness] : no joint stiffness [Back Pain] : no back pain [Joint Swelling] : no joint swelling [Itching] : no itching [Headache] : no headache [Dizziness] : no dizziness [Suicidal] : not suicidal [Insomnia] : no insomnia [Anxiety] : no anxiety [Depression] : no depression

## 2019-10-11 NOTE — PHYSICAL EXAM
[No Acute Distress] : no acute distress [Well Nourished] : well nourished [Well Developed] : well developed [Well-Appearing] : well-appearing [Normal Sclera/Conjunctiva] : normal sclera/conjunctiva [Normal Outer Ear/Nose] : the outer ears and nose were normal in appearance [Normal Oropharynx] : the oropharynx was normal [No JVD] : no jugular venous distention [No Lymphadenopathy] : no lymphadenopathy [Supple] : supple [No Respiratory Distress] : no respiratory distress  [No Accessory Muscle Use] : no accessory muscle use [Clear to Auscultation] : lungs were clear to auscultation bilaterally [Normal Rate] : normal rate  [Regular Rhythm] : with a regular rhythm [Normal S1, S2] : normal S1 and S2 [No Edema] : there was no peripheral edema [Soft] : abdomen soft [Non Tender] : non-tender [Normal Bowel Sounds] : normal bowel sounds [No CVA Tenderness] : no CVA  tenderness [No Spinal Tenderness] : no spinal tenderness [No Joint Swelling] : no joint swelling [No Focal Deficits] : no focal deficits [Normal Affect] : the affect was normal [Alert and Oriented x3] : oriented to person, place, and time

## 2019-10-11 NOTE — ASSESSMENT
[FreeTextEntry1] : 33 yo M with PMH of schizoaffective disorder, DLD, obesity, chronic constipation, urinary incontinence, vitamin D deficiency, resides in group home, here at clinic for routine follow up.\par \par #) Cough \par - Resolved\par - CXR negative for pathology\par \par #) Schizoaffective disorder \par - Stable, on Clozapine 100mg in AM, 200mg in PM\par - No leukopenia, WBC 7.16\par - Continue to follow up with Psych\par - Will repeat CBC\par \par #) DLD \par - Last JOM=398, OW=226 from Mar 2019\par - Counselled on diet + exercise\par - Repeat lipid panel\par \par #) Urinary incontinence \par - Stable, c/w Oxybutynin, follows w/ Urology\par \par #) Vitamin D deficiency \par - Last level 30 from Mar 2019, wants to stop supplement\par - Counselled on diet + lifestyle mods\par \par #) Chronic constipation \par - Stable, c/w Docusate\par \par #) Obesity \par - A1C 5.3\par - stable, counselled on diet and exercise\par \par \par #) HCM.\par - Repeat labs: CBC, CMP, HgA1C, TSH, lipid panel\par - active smoker - trying to quit on his own, 4 cigs per day\par - Received flu vaccine last week at adult home\par - RTC in 6 months or PRN. \par

## 2019-10-22 DIAGNOSIS — R32 UNSPECIFIED URINARY INCONTINENCE: ICD-10-CM

## 2019-10-22 DIAGNOSIS — E78.00 PURE HYPERCHOLESTEROLEMIA, UNSPECIFIED: ICD-10-CM

## 2019-10-22 DIAGNOSIS — K59.09 OTHER CONSTIPATION: ICD-10-CM

## 2019-10-22 DIAGNOSIS — F25.0 SCHIZOAFFECTIVE DISORDER, BIPOLAR TYPE: ICD-10-CM

## 2019-10-31 ENCOUNTER — OUTPATIENT (OUTPATIENT)
Dept: OUTPATIENT SERVICES | Facility: HOSPITAL | Age: 34
LOS: 1 days | Discharge: HOME | End: 2019-10-31

## 2019-10-31 DIAGNOSIS — Z79.899 OTHER LONG TERM (CURRENT) DRUG THERAPY: ICD-10-CM

## 2019-10-31 DIAGNOSIS — F20.9 SCHIZOPHRENIA, UNSPECIFIED: ICD-10-CM

## 2019-11-26 ENCOUNTER — OUTPATIENT (OUTPATIENT)
Dept: OUTPATIENT SERVICES | Facility: HOSPITAL | Age: 34
LOS: 1 days | Discharge: HOME | End: 2019-11-26

## 2019-12-14 DIAGNOSIS — F20.9 SCHIZOPHRENIA, UNSPECIFIED: ICD-10-CM

## 2019-12-14 DIAGNOSIS — Z79.899 OTHER LONG TERM (CURRENT) DRUG THERAPY: ICD-10-CM

## 2019-12-24 ENCOUNTER — OUTPATIENT (OUTPATIENT)
Dept: OUTPATIENT SERVICES | Facility: HOSPITAL | Age: 34
LOS: 1 days | Discharge: HOME | End: 2019-12-24

## 2019-12-24 DIAGNOSIS — F20.9 SCHIZOPHRENIA, UNSPECIFIED: ICD-10-CM

## 2019-12-24 DIAGNOSIS — Z79.899 OTHER LONG TERM (CURRENT) DRUG THERAPY: ICD-10-CM

## 2020-04-02 ENCOUNTER — APPOINTMENT (OUTPATIENT)
Dept: INTERNAL MEDICINE | Facility: CLINIC | Age: 35
End: 2020-04-02

## 2020-06-23 ENCOUNTER — RX RENEWAL (OUTPATIENT)
Age: 35
End: 2020-06-23

## 2020-06-24 ENCOUNTER — RX RENEWAL (OUTPATIENT)
Age: 35
End: 2020-06-24

## 2020-07-21 ENCOUNTER — RX RENEWAL (OUTPATIENT)
Age: 35
End: 2020-07-21

## 2020-07-21 RX ORDER — OXYBUTYNIN CHLORIDE 5 MG/1
5 TABLET, EXTENDED RELEASE ORAL
Qty: 90 | Refills: 3 | Status: ACTIVE | COMMUNITY
Start: 2017-12-21 | End: 1900-01-01

## 2020-10-26 ENCOUNTER — APPOINTMENT (OUTPATIENT)
Dept: INTERNAL MEDICINE | Facility: CLINIC | Age: 35
End: 2020-10-26

## 2021-07-14 ENCOUNTER — RX RENEWAL (OUTPATIENT)
Age: 36
End: 2021-07-14

## 2021-09-09 ENCOUNTER — APPOINTMENT (OUTPATIENT)
Dept: UROLOGY | Facility: CLINIC | Age: 36
End: 2021-09-09

## 2021-11-29 ENCOUNTER — APPOINTMENT (OUTPATIENT)
Dept: OPHTHALMOLOGY | Facility: CLINIC | Age: 36
End: 2021-11-29

## 2024-05-14 NOTE — ED PROVIDER NOTE - NSFOLLOWUPINSTRUCTIONS_ED_ALL_ED_FT
EENMT ED evaluation and management discussed with the patient and family (if available) in detail.  Close PMD follow up encouraged.  Strict ED return instructions discussed in detail and patient given the opportunity to ask any questions about their discharge diagnosis and instructions. Patient verbalized understanding.    Please follow up with your psychiatrist in 1-3 days.

## 2024-07-28 NOTE — HISTORY OF PRESENT ILLNESS
[FreeTextEntry1] : follow up [de-identified] : 35 yo M with PMH of schizoaffective disorder, DLD, obesity, chronic constipation, urinary incontinence, vitamin D deficiency, resides in group home, here at clinic for routine follow up. Reports doing well. Denies any major complaints or symptoms. Was admitted to [facility] in March for PNA, discharged on course of Doxy. Much better after finishing Abx. Does report mild, non-productive, persistent cough. No SOB, fevers, chills, chest pain. Active smoker ~5 cig / day. none